# Patient Record
Sex: FEMALE | Race: WHITE | NOT HISPANIC OR LATINO | Employment: OTHER | URBAN - NONMETROPOLITAN AREA
[De-identification: names, ages, dates, MRNs, and addresses within clinical notes are randomized per-mention and may not be internally consistent; named-entity substitution may affect disease eponyms.]

---

## 2017-06-21 ENCOUNTER — OFFICE VISIT (OUTPATIENT)
Dept: GASTROENTEROLOGY | Facility: CLINIC | Age: 72
End: 2017-06-21

## 2017-06-21 VITALS
WEIGHT: 177 LBS | RESPIRATION RATE: 14 BRPM | DIASTOLIC BLOOD PRESSURE: 79 MMHG | HEIGHT: 62 IN | HEART RATE: 82 BPM | SYSTOLIC BLOOD PRESSURE: 128 MMHG | BODY MASS INDEX: 32.57 KG/M2 | TEMPERATURE: 97.5 F

## 2017-06-21 DIAGNOSIS — R13.10 DYSPHAGIA, UNSPECIFIED TYPE: ICD-10-CM

## 2017-06-21 DIAGNOSIS — Z12.11 COLON CANCER SCREENING: ICD-10-CM

## 2017-06-21 DIAGNOSIS — K59.00 CONSTIPATION, UNSPECIFIED CONSTIPATION TYPE: Primary | ICD-10-CM

## 2017-06-21 DIAGNOSIS — R12 HEARTBURN: ICD-10-CM

## 2017-06-21 PROCEDURE — 99203 OFFICE O/P NEW LOW 30 MIN: CPT | Performed by: INTERNAL MEDICINE

## 2017-06-21 RX ORDER — BUSPIRONE HYDROCHLORIDE 10 MG/1
10 TABLET ORAL DAILY
COMMUNITY
Start: 2017-04-26

## 2017-06-21 RX ORDER — ALENDRONATE SODIUM 70 MG/1
70 TABLET ORAL
COMMUNITY
Start: 2017-04-26 | End: 2017-07-12

## 2017-06-21 RX ORDER — PANTOPRAZOLE SODIUM 40 MG/1
40 TABLET, DELAYED RELEASE ORAL DAILY
COMMUNITY
Start: 2017-05-24

## 2017-06-21 RX ORDER — AMLODIPINE BESYLATE 10 MG/1
10 TABLET ORAL DAILY
COMMUNITY
Start: 2017-05-26

## 2017-06-21 RX ORDER — DULOXETIN HYDROCHLORIDE 60 MG/1
60 CAPSULE, DELAYED RELEASE ORAL DAILY
COMMUNITY
Start: 2017-04-26

## 2017-06-21 RX ORDER — NORTRIPTYLINE HYDROCHLORIDE 50 MG/1
50 CAPSULE ORAL NIGHTLY
COMMUNITY
Start: 2017-05-26

## 2017-06-21 RX ORDER — GABAPENTIN 100 MG/1
100 CAPSULE ORAL 3 TIMES DAILY
COMMUNITY
Start: 2017-04-25

## 2017-06-21 RX ORDER — ATORVASTATIN CALCIUM 10 MG/1
10 TABLET, FILM COATED ORAL DAILY
COMMUNITY
Start: 2017-06-16

## 2017-06-21 NOTE — PROGRESS NOTES
"Chief Complaint   Patient presents with   • Heartburn       History of Present Illness      The patient has a history of constipation off and on for the last 1-2 years. Severity is moderate. This is described as hard stools perhaps one bowel movement twice a week. The patient takes occasional stool softeners and laxatives to have a bowel movement. There is no associated rectal pain.    The patient has a history of reflux off and on for the last several years.  The reflux is moderately severe.  Symptoms are described as retrosternal burning sensation, and indigestion.  There is history of occasional regurgitative symptoms.  Frequency being several times per week.  The symptoms are worse at night.  The patient takes acid suppressive therapy with reasonable control of his symptoms.  Although, the patient has some difficulty swallowing home medications and occasionally \"biscuits\" otherwise she denies significant dysphagia with other foods.  There is no recent weight loss.  There is no nausea or vomiting.  There is no history of overt GI bleed (Hematemesis, melena or hematochezia).  There is no recent weight loss.  The patient denies history of anemia.  There is no history of liver or pancreatic disease.    Review of Systems   Constitutional: Positive for fatigue. Negative for appetite change, chills, fever and unexpected weight change.   HENT: Negative for mouth sores, nosebleeds and trouble swallowing.    Eyes: Negative for discharge and redness.   Respiratory: Negative for apnea, cough and shortness of breath.    Cardiovascular: Negative for chest pain, palpitations and leg swelling.   Gastrointestinal: Positive for constipation. Negative for abdominal distention, abdominal pain, anal bleeding, blood in stool, diarrhea, nausea and vomiting.   Endocrine: Negative for cold intolerance, heat intolerance and polydipsia.   Genitourinary: Negative for dysuria, hematuria and urgency.   Musculoskeletal: Positive for " arthralgias. Negative for joint swelling and myalgias.   Skin: Negative for rash.   Allergic/Immunologic: Positive for environmental allergies. Negative for food allergies and immunocompromised state.   Neurological: Negative for dizziness, seizures, syncope and headaches.   Hematological: Negative for adenopathy. Does not bruise/bleed easily.   Psychiatric/Behavioral: Negative for dysphoric mood. The patient is not nervous/anxious and is not hyperactive.      There is no problem list on file for this patient.    Past Medical History:   Diagnosis Date   • Arthritis    • Colon polyp    • Degeneration of lumbar intervertebral disc    • Disorder of vitamin B12    • GERD (gastroesophageal reflux disease)    • Hematuria    • Hyperlipidemia    • Hypertensive disorder    • Mixed anxiety and depressive disorder      Past Surgical History:   Procedure Laterality Date   • APPENDECTOMY  1948   • BACK SURGERY      4 screws in back   • CATARACT EXTRACTION  2015   • TOTAL ABDOMINAL HYSTERECTOMY  1990s     Family History   Problem Relation Age of Onset   • Colon cancer Neg Hx      Social History   Substance Use Topics   • Smoking status: Never Smoker   • Smokeless tobacco: Never Used   • Alcohol use No      Comment: social       Current Outpatient Prescriptions:   •  alendronate (FOSAMAX) 70 MG tablet, , Disp: , Rfl:   •  amLODIPine (NORVASC) 10 MG tablet, , Disp: , Rfl:   •  atorvastatin (LIPITOR) 10 MG tablet, , Disp: , Rfl:   •  Bisacodyl (DULCOLAX PO), Take  by mouth As Needed., Disp: , Rfl:   •  busPIRone (BUSPAR) 10 MG tablet, , Disp: , Rfl:   •  Cholecalciferol (VITAMIN D PO), Take  by mouth., Disp: , Rfl:   •  Cyanocobalamin (VITAMIN B-12 PO), Take  by mouth., Disp: , Rfl:   •  DULoxetine (CYMBALTA) 60 MG capsule, , Disp: , Rfl:   •  gabapentin (NEURONTIN) 100 MG capsule, , Disp: , Rfl:   •  nortriptyline (PAMELOR) 50 MG capsule, , Disp: , Rfl:   •  pantoprazole (PROTONIX) 40 MG EC tablet, , Disp: , Rfl:     Allergies  "  Allergen Reactions   • Codeine Hives       Blood pressure 128/79, pulse 82, temperature 97.5 °F (36.4 °C), resp. rate 14, height 62\" (157.5 cm), weight 177 lb (80.3 kg).    Physical Exam   Constitutional: She is oriented to person, place, and time. She appears well-developed and well-nourished. No distress.   HENT:   Head: Normocephalic and atraumatic.   Right Ear: Hearing and external ear normal.   Left Ear: Hearing and external ear normal.   Nose: Nose normal.   Mouth/Throat: Oropharynx is clear and moist and mucous membranes are normal. Mucous membranes are not pale, not dry and not cyanotic. No oral lesions. No oropharyngeal exudate.   Eyes: Conjunctivae and EOM are normal. Right eye exhibits no discharge. Left eye exhibits no discharge. No scleral icterus.   Neck: Trachea normal. Neck supple. No JVD present. No edema present. No thyroid mass and no thyromegaly present.   Cardiovascular: Normal rate, regular rhythm, S2 normal and normal heart sounds.  Exam reveals no gallop, no S3 and no friction rub.    No murmur heard.  Pulmonary/Chest: Effort normal and breath sounds normal. No respiratory distress. She has no wheezes. She has no rales. She exhibits no tenderness.   Abdominal: Soft. Normal appearance and bowel sounds are normal. She exhibits no distension, no ascites and no mass. There is no splenomegaly or hepatomegaly. There is no tenderness. There is no rigidity, no rebound and no guarding. No hernia.   Musculoskeletal: She exhibits no tenderness or deformity.       Vascular Status -  Her exam exhibits no right foot edema. Her exam exhibits no left foot edema.  Lymphadenopathy:     She has no cervical adenopathy.        Left: No supraclavicular adenopathy present.   Neurological: She is alert and oriented to person, place, and time. She has normal strength. No cranial nerve deficit or sensory deficit. She exhibits normal muscle tone. Coordination normal.   Skin: No rash noted. She is not diaphoretic. No " cyanosis. No pallor. Nails show no clubbing.   Psychiatric: She has a normal mood and affect. Her behavior is normal. Judgment and thought content normal.   Nursing note and vitals reviewed.    Stigmata of chronic liver disease:  None.  Asterixis:  None.    Procedure:  Upon review of medical records:    Dated January 31, 2014 the patient underwent an upper endoscopy which revealed:  Schtazki’s type ring, post dilation to 18 mm.  Sliding hiatal hernia of 3 - 5 cm.  Erythematous/erosive gastritis. Polypoid area at cardia, biopsied.  Subtle concentric rings within the mid esophagus raising the possibility of eosinophilic esophagitis.  Some free gastroesophageal reflux was seen.  No Stovall mucosa was noted.  No scalloping was seen within the second portion of the duodenum.  Second portion of duodenum, biopsy revealed no significant pathologic alterations.  Negative for celiac disease.  Gastric antrum, biopsy revealed reactive gastropathy (chemical gastropathy).  No h. pylori identified (negative CFV stain, adequate control).  Negative for metaplasia, dysplasia, or malignancy.  Mid Esophagus, biopsy revealed reflux esophagitis.  No metaplasia identified (negative AB/PAS stain, adequate control).  Negative for dysplasia, and malignancy.  Cardia biopsy revealed chronic follicular gastritis.  No H. pylori identified (negative CFV stain, adequate control).  Negative for metaplasia, dysplasia, or malignancy.      Dated February 26, 2014 the patient underwent a colonoscopy to the cecum which revealed:  Pandiverticulosis.  Scant vascular ectasia.  Colon polyps.  Internal/small external hemorrhoids.  Rectal polyps, biopsy did not reveal adenomatous change.  The patient's colon was noted to be significantly tortuous and redundant.    Assessment and Plan:      Paulette was seen today for heartburn.    Diagnoses and all orders for this visit:    Constipation, unspecified constipation type  Comments:  Likely  multifactorial.    Colon cancer screening  Comments:  For colon cancer screening.  The patient has history of rather tortuous and redundant colon.    Heartburn  Comments:  Stable.    Dysphagia, unspecified type  Comments:  Some difficulty swallowing the pills and biscuits. Likely secondary to esophageal dysmotility. Previously she had Schatzki's ring which was dilated to 18 mm              Plan  and Patient Instructions:    Patient Instructions     1. Anti-reflex measures.  2. Low-fat high-fiber diet with liberal water intake.  3. Dietary instructions.  The patient should eat relatively soft diet.  She should eat in upright position and chew well.  The patient should drink water after to 3 bites and take medications with liberal amounts of water.  Generally medications that have a potential to cause pill esophagitis may be avoided or used in an alternative form (for example if the patient needs potassium it may be used in a liquid rather than pill form).  Furthermore after eating, and taking medications the patient should remain in upright position for 5-10 minutes.  4. Colonoscopy: Description of the procedure, risks benefits alternatives and options including non-operative options were discussed with the patient in detail.  The patient understands and wishes to proceed.          Enohc Quevedo MD

## 2017-06-22 NOTE — PATIENT INSTRUCTIONS
1. Anti-reflex measures.  2. Low-fat high-fiber diet with liberal water intake.  3. Dietary instructions.  The patient should eat relatively soft diet.  She should eat in upright position and chew well.  The patient should drink water after to 3 bites and take medications with liberal amounts of water.  Generally medications that have a potential to cause pill esophagitis may be avoided or used in an alternative form (for example if the patient needs potassium it may be used in a liquid rather than pill form).  Furthermore after eating, and taking medications the patient should remain in upright position for 5-10 minutes.  4. Colonoscopy: Description of the procedure, risks benefits alternatives and options including non-operative options were discussed with the patient in detail.  The patient understands and wishes to proceed.

## 2017-07-05 ENCOUNTER — PREP FOR SURGERY (OUTPATIENT)
Dept: OTHER | Facility: HOSPITAL | Age: 72
End: 2017-07-05

## 2017-07-05 DIAGNOSIS — Z12.11 COLON CANCER SCREENING: ICD-10-CM

## 2017-07-05 DIAGNOSIS — K59.00 CONSTIPATION, UNSPECIFIED CONSTIPATION TYPE: Primary | ICD-10-CM

## 2017-07-05 RX ORDER — SODIUM CHLORIDE 9 MG/ML
70 INJECTION, SOLUTION INTRAVENOUS CONTINUOUS PRN
Status: CANCELLED | OUTPATIENT
Start: 2017-07-05

## 2017-07-05 RX ORDER — SODIUM CHLORIDE 0.9 % (FLUSH) 0.9 %
1-10 SYRINGE (ML) INJECTION AS NEEDED
Status: CANCELLED | OUTPATIENT
Start: 2017-07-05

## 2017-07-18 ENCOUNTER — ANESTHESIA (OUTPATIENT)
Dept: GASTROENTEROLOGY | Facility: HOSPITAL | Age: 72
End: 2017-07-18

## 2017-07-18 ENCOUNTER — HOSPITAL ENCOUNTER (OUTPATIENT)
Facility: HOSPITAL | Age: 72
Setting detail: HOSPITAL OUTPATIENT SURGERY
Discharge: HOME | End: 2017-07-18
Attending: INTERNAL MEDICINE | Admitting: INTERNAL MEDICINE

## 2017-07-18 ENCOUNTER — ANESTHESIA EVENT (OUTPATIENT)
Dept: GASTROENTEROLOGY | Facility: HOSPITAL | Age: 72
End: 2017-07-18

## 2017-07-18 VITALS
HEIGHT: 62 IN | DIASTOLIC BLOOD PRESSURE: 61 MMHG | OXYGEN SATURATION: 93 % | TEMPERATURE: 96.7 F | SYSTOLIC BLOOD PRESSURE: 116 MMHG | WEIGHT: 175 LBS | RESPIRATION RATE: 18 BRPM | HEART RATE: 80 BPM | BODY MASS INDEX: 32.2 KG/M2

## 2017-07-18 DIAGNOSIS — K59.00 CONSTIPATION, UNSPECIFIED CONSTIPATION TYPE: ICD-10-CM

## 2017-07-18 DIAGNOSIS — Z12.11 COLON CANCER SCREENING: ICD-10-CM

## 2017-07-18 PROCEDURE — 25010000002 PROPOFOL 10 MG/ML EMULSION: Performed by: NURSE ANESTHETIST, CERTIFIED REGISTERED

## 2017-07-18 PROCEDURE — 25010000002 PROPOFOL 1000 MG/ML EMULSION: Performed by: NURSE ANESTHETIST, CERTIFIED REGISTERED

## 2017-07-18 PROCEDURE — 45388 COLONOSCOPY W/ABLATION: CPT | Performed by: INTERNAL MEDICINE

## 2017-07-18 PROCEDURE — 45390 COLONOSCOPY W/RESECTION: CPT | Performed by: INTERNAL MEDICINE

## 2017-07-18 PROCEDURE — 45384 COLONOSCOPY W/LESION REMOVAL: CPT | Performed by: INTERNAL MEDICINE

## 2017-07-18 PROCEDURE — 88305 TISSUE EXAM BY PATHOLOGIST: CPT | Performed by: INTERNAL MEDICINE

## 2017-07-18 DEVICE — DEV CLIP ENDO RESOLUTION360 CONTRL ROT 235CM: Type: IMPLANTABLE DEVICE | Status: FUNCTIONAL

## 2017-07-18 DEVICE — DEV CLIP GI QUICKCLIPPRO FIX ROT 2300MM: Type: IMPLANTABLE DEVICE | Status: FUNCTIONAL

## 2017-07-18 RX ORDER — PROPOFOL 10 MG/ML
VIAL (ML) INTRAVENOUS AS NEEDED
Status: DISCONTINUED | OUTPATIENT
Start: 2017-07-18 | End: 2017-07-18 | Stop reason: SURG

## 2017-07-18 RX ORDER — SODIUM CHLORIDE 0.9 % (FLUSH) 0.9 %
1-10 SYRINGE (ML) INJECTION AS NEEDED
Status: DISCONTINUED | OUTPATIENT
Start: 2017-07-18 | End: 2017-07-18 | Stop reason: HOSPADM

## 2017-07-18 RX ORDER — SODIUM CHLORIDE 9 MG/ML
70 INJECTION, SOLUTION INTRAVENOUS CONTINUOUS PRN
Status: DISCONTINUED | OUTPATIENT
Start: 2017-07-18 | End: 2017-07-18 | Stop reason: HOSPADM

## 2017-07-18 RX ADMIN — PROPOFOL 30 MG: 10 INJECTION, EMULSION INTRAVENOUS at 08:42

## 2017-07-18 RX ADMIN — PROPOFOL 100 MCG/KG/MIN: 10 INJECTION, EMULSION INTRAVENOUS at 08:43

## 2017-07-18 RX ADMIN — SODIUM CHLORIDE 70 ML/HR: 9 INJECTION, SOLUTION INTRAVENOUS at 08:30

## 2017-07-18 RX ADMIN — PROPOFOL 50 MG: 10 INJECTION, EMULSION INTRAVENOUS at 08:49

## 2017-07-18 NOTE — ANESTHESIA PREPROCEDURE EVALUATION
Anesthesia Evaluation     Patient summary reviewed and Nursing notes reviewed   no history of anesthetic complications:  NPO Solid Status: > 8 hours  NPO Liquid Status: > 8 hours     Airway   Mallampati: I  TM distance: >3 FB  Neck ROM: full  no difficulty expected  Dental    (+) poor dentition    Pulmonary - negative pulmonary ROS and normal exam   Cardiovascular - normal exam    (+) hypertension well controlled, hyperlipidemia      Neuro/Psych  (+) psychiatric history,    GI/Hepatic/Renal/Endo    (+) obesity,  GERD well controlled,     Musculoskeletal     Abdominal    Substance History - negative use     OB/GYN negative ob/gyn ROS         Other   (+) arthritis                                     Anesthesia Plan    ASA 2     MAC   (Risks and benefits discussed including risk of aspiration, recall and dental damage. All patient questions answered. Will continue with POC.)  intravenous induction   Anesthetic plan and risks discussed with patient.

## 2017-07-18 NOTE — PLAN OF CARE
Problem: GI Endoscopy (Adult)  Goal: Signs and Symptoms of Listed Potential Problems Will be Absent or Manageable (GI Endoscopy)  Outcome: Ongoing (interventions implemented as appropriate)    07/18/17 0820   GI Endoscopy   Problems Assessed (GI Endoscopy) all   Problems Present (GI Endoscopy) none

## 2017-07-18 NOTE — OP NOTE
PROCEDURE:  Colonoscopy to the terminal ileum with one endoscopic mucosal resection, 11 hot biopsy polypectomies, submucosal injection of normal saline to raise the lesion, thermal ablation therapy using argon plasma coagulator and placement of 2 quick clip pros and one resolution clip to coapt the margins.      DATE OF PROCEDURE:  July 18, 2017    REFERRING PROVIDER:  Mesfin Amin MD     INSTRUMENT USED:   Olympus PCF H 190 videocolonoscope.      INDICATIONS OF THE PROCEDURE: This is a 71-year-old white female for colon cancer screening.  There is history of constipation.       BIOPSIES:  Cecum: 1 endoscopic mucosal resection.  Rectum and sigmoid colon: 11 hot biopsy polypectomies.       PHOTOGRAPHS:  Photographs were included in the medical records.     MEDICATIONS:  MAC.       CONSENT/PREPROCEDURE EVALUATION:  Risks, benefits, alternatives and options of the procedure including risks of sedation/anesthesia were discussed with the patient and informed consent was obtained prior to the procedure.  History and physical examination were performed and nothing precluded the test.      REPORT:  The patient was placed in left lateral decubitus position and a digital examination was performed.  Once under the influence of IV sedation, the instrument was inserted into the rectum and advanced under direct vision to cecum which was identified by the ileocecal valve, triradiate folds and appendiceal orifice. The scope was then maneuvered into the terminal ileum.        FINDINGS:      Digital rectal examination:  Good anal tone.  No perianal pathology.  No mass.        Terminal ileum:  7-8 cm.  Normal.     Cecum and ascending colon: .  A 1.25 cm flat polypoid lesion was noted in the cecum.  Submucosal injection of normal saline 1 ml was undertaken to raise the area . To raise the lesion.  Endoscopic mucosal resection was undertaken using hot snare and cold snare techniques.  This area was further treated with ablation  using argon plasma coagulator (Colon APC mode/ERBE/Side firing probe).  2 quick clip pros and one resolution clip were applied to coapt the margins.  Diverticulosis was noted.      Hepatic flexure, transverse colon, splenic flexure:Diverticulosis.         Descending colon, sigmoid colon and rectum: Diverticulosis.  11, 3-4 mm sessile polyps in the sigmoid colon and rectum were removed with hot biopsy forceps.  Scant vascular ectasia was noted in the descending colon.  A retroflex examination within the rectum revealed internal hemorrhoids.        The scope was then straightened, the lower GI tract was decompressed, and the scope was pulled out of the patient.  The patient tolerated the procedure well.  There were no immediate complications and the patient was transferred in stable condition for post procedure observation.      TECHNICAL DATA:   Polson prep score: 8 (3+2+3).     Difficulty of examination:  Somewhat difficult.  The colon was noted to be tortuous redundant and adherent.   This required the patient to be in left lateral decubitus, supine, and right lateral decubitus position.  Advance the scope into the cecum and then into the terminal ileum.    Withdrawal time: 15 min.    Anus to cecal time: 14 minutes.    Procedure Time: 53 minutes.    Retroflex examination in right colon: Yes.    Second look Rectum to cecum with decompression: Yes.    DIAGNOSES:    1. Pandiverticulosis.  2. Colon polyps.  3-12.5 mm    3. Scant vascular ectasia.  4. Internal hemorrhoids.    RECOMMENDATIONS:     1. Follow biopsies.    2. Follow-up:      3. Followup colonoscopy: Depending on the biopsy results.       4. Dietary instructions.     Thank you very much for letting me participate in the care of this patient. Please do not hesitate to call me if you have any questions.

## 2017-07-18 NOTE — ANESTHESIA POSTPROCEDURE EVALUATION
Patient: Paulette Oshea    Procedure Summary     Date Anesthesia Start Anesthesia Stop Room / Location    07/18/17 0841  [facility] ENDOSCOPY 2 / [facility] ENDOSCOPY       Procedure Diagnosis Surgeon Provider    COLONOSCOPY with endoscopic mucosal resection, argon thermal ablation,  normal saline injection, resolution and pro clip placement, hot biopsy polypectomies (N/A Anus) Colon cancer screening; Constipation, unspecified constipation type  (Colon cancer screening [Z12.11]; Constipation, unspecified constipation type [K59.00]) MD Shahid Solano CRNA          Anesthesia Type: MAC  Last vitals  BP      Temp      Pulse     Resp      SpO2        Post Anesthesia Care and Evaluation    Patient location during evaluation: PHASE II  Patient participation: complete - patient participated  Level of consciousness: awake and alert  Pain score: 0  Pain management: satisfactory to patient  Airway patency: patent  Anesthetic complications: No anesthetic complications  PONV Status: none  Cardiovascular status: acceptable and stable  Respiratory status: acceptable and nasal cannula  Hydration status: acceptable

## 2017-07-18 NOTE — H&P (VIEW-ONLY)
"Chief Complaint   Patient presents with   • Heartburn       History of Present Illness      The patient has a history of constipation off and on for the last 1-2 years. Severity is moderate. This is described as hard stools perhaps one bowel movement twice a week. The patient takes occasional stool softeners and laxatives to have a bowel movement. There is no associated rectal pain.    The patient has a history of reflux off and on for the last several years.  The reflux is moderately severe.  Symptoms are described as retrosternal burning sensation, and indigestion.  There is history of occasional regurgitative symptoms.  Frequency being several times per week.  The symptoms are worse at night.  The patient takes acid suppressive therapy with reasonable control of his symptoms.  Although, the patient has some difficulty swallowing home medications and occasionally \"biscuits\" otherwise she denies significant dysphagia with other foods.  There is no recent weight loss.  There is no nausea or vomiting.  There is no history of overt GI bleed (Hematemesis, melena or hematochezia).  There is no recent weight loss.  The patient denies history of anemia.  There is no history of liver or pancreatic disease.    Review of Systems   Constitutional: Positive for fatigue. Negative for appetite change, chills, fever and unexpected weight change.   HENT: Negative for mouth sores, nosebleeds and trouble swallowing.    Eyes: Negative for discharge and redness.   Respiratory: Negative for apnea, cough and shortness of breath.    Cardiovascular: Negative for chest pain, palpitations and leg swelling.   Gastrointestinal: Positive for constipation. Negative for abdominal distention, abdominal pain, anal bleeding, blood in stool, diarrhea, nausea and vomiting.   Endocrine: Negative for cold intolerance, heat intolerance and polydipsia.   Genitourinary: Negative for dysuria, hematuria and urgency.   Musculoskeletal: Positive for " arthralgias. Negative for joint swelling and myalgias.   Skin: Negative for rash.   Allergic/Immunologic: Positive for environmental allergies. Negative for food allergies and immunocompromised state.   Neurological: Negative for dizziness, seizures, syncope and headaches.   Hematological: Negative for adenopathy. Does not bruise/bleed easily.   Psychiatric/Behavioral: Negative for dysphoric mood. The patient is not nervous/anxious and is not hyperactive.      There is no problem list on file for this patient.    Past Medical History:   Diagnosis Date   • Arthritis    • Colon polyp    • Degeneration of lumbar intervertebral disc    • Disorder of vitamin B12    • GERD (gastroesophageal reflux disease)    • Hematuria    • Hyperlipidemia    • Hypertensive disorder    • Mixed anxiety and depressive disorder      Past Surgical History:   Procedure Laterality Date   • APPENDECTOMY  1948   • BACK SURGERY      4 screws in back   • CATARACT EXTRACTION  2015   • TOTAL ABDOMINAL HYSTERECTOMY  1990s     Family History   Problem Relation Age of Onset   • Colon cancer Neg Hx      Social History   Substance Use Topics   • Smoking status: Never Smoker   • Smokeless tobacco: Never Used   • Alcohol use No      Comment: social       Current Outpatient Prescriptions:   •  alendronate (FOSAMAX) 70 MG tablet, , Disp: , Rfl:   •  amLODIPine (NORVASC) 10 MG tablet, , Disp: , Rfl:   •  atorvastatin (LIPITOR) 10 MG tablet, , Disp: , Rfl:   •  Bisacodyl (DULCOLAX PO), Take  by mouth As Needed., Disp: , Rfl:   •  busPIRone (BUSPAR) 10 MG tablet, , Disp: , Rfl:   •  Cholecalciferol (VITAMIN D PO), Take  by mouth., Disp: , Rfl:   •  Cyanocobalamin (VITAMIN B-12 PO), Take  by mouth., Disp: , Rfl:   •  DULoxetine (CYMBALTA) 60 MG capsule, , Disp: , Rfl:   •  gabapentin (NEURONTIN) 100 MG capsule, , Disp: , Rfl:   •  nortriptyline (PAMELOR) 50 MG capsule, , Disp: , Rfl:   •  pantoprazole (PROTONIX) 40 MG EC tablet, , Disp: , Rfl:     Allergies  "  Allergen Reactions   • Codeine Hives       Blood pressure 128/79, pulse 82, temperature 97.5 °F (36.4 °C), resp. rate 14, height 62\" (157.5 cm), weight 177 lb (80.3 kg).    Physical Exam   Constitutional: She is oriented to person, place, and time. She appears well-developed and well-nourished. No distress.   HENT:   Head: Normocephalic and atraumatic.   Right Ear: Hearing and external ear normal.   Left Ear: Hearing and external ear normal.   Nose: Nose normal.   Mouth/Throat: Oropharynx is clear and moist and mucous membranes are normal. Mucous membranes are not pale, not dry and not cyanotic. No oral lesions. No oropharyngeal exudate.   Eyes: Conjunctivae and EOM are normal. Right eye exhibits no discharge. Left eye exhibits no discharge. No scleral icterus.   Neck: Trachea normal. Neck supple. No JVD present. No edema present. No thyroid mass and no thyromegaly present.   Cardiovascular: Normal rate, regular rhythm, S2 normal and normal heart sounds.  Exam reveals no gallop, no S3 and no friction rub.    No murmur heard.  Pulmonary/Chest: Effort normal and breath sounds normal. No respiratory distress. She has no wheezes. She has no rales. She exhibits no tenderness.   Abdominal: Soft. Normal appearance and bowel sounds are normal. She exhibits no distension, no ascites and no mass. There is no splenomegaly or hepatomegaly. There is no tenderness. There is no rigidity, no rebound and no guarding. No hernia.   Musculoskeletal: She exhibits no tenderness or deformity.       Vascular Status -  Her exam exhibits no right foot edema. Her exam exhibits no left foot edema.  Lymphadenopathy:     She has no cervical adenopathy.        Left: No supraclavicular adenopathy present.   Neurological: She is alert and oriented to person, place, and time. She has normal strength. No cranial nerve deficit or sensory deficit. She exhibits normal muscle tone. Coordination normal.   Skin: No rash noted. She is not diaphoretic. No " cyanosis. No pallor. Nails show no clubbing.   Psychiatric: She has a normal mood and affect. Her behavior is normal. Judgment and thought content normal.   Nursing note and vitals reviewed.    Stigmata of chronic liver disease:  None.  Asterixis:  None.    Procedure:  Upon review of medical records:    Dated January 31, 2014 the patient underwent an upper endoscopy which revealed:  Schtazki’s type ring, post dilation to 18 mm.  Sliding hiatal hernia of 3 - 5 cm.  Erythematous/erosive gastritis. Polypoid area at cardia, biopsied.  Subtle concentric rings within the mid esophagus raising the possibility of eosinophilic esophagitis.  Some free gastroesophageal reflux was seen.  No Stovall mucosa was noted.  No scalloping was seen within the second portion of the duodenum.  Second portion of duodenum, biopsy revealed no significant pathologic alterations.  Negative for celiac disease.  Gastric antrum, biopsy revealed reactive gastropathy (chemical gastropathy).  No h. pylori identified (negative CFV stain, adequate control).  Negative for metaplasia, dysplasia, or malignancy.  Mid Esophagus, biopsy revealed reflux esophagitis.  No metaplasia identified (negative AB/PAS stain, adequate control).  Negative for dysplasia, and malignancy.  Cardia biopsy revealed chronic follicular gastritis.  No H. pylori identified (negative CFV stain, adequate control).  Negative for metaplasia, dysplasia, or malignancy.      Dated February 26, 2014 the patient underwent a colonoscopy to the cecum which revealed:  Pandiverticulosis.  Scant vascular ectasia.  Colon polyps.  Internal/small external hemorrhoids.  Rectal polyps, biopsy did not reveal adenomatous change.  The patient's colon was noted to be significantly tortuous and redundant.    Assessment and Plan:      Paulette was seen today for heartburn.    Diagnoses and all orders for this visit:    Constipation, unspecified constipation type  Comments:  Likely  multifactorial.    Colon cancer screening  Comments:  For colon cancer screening.  The patient has history of rather tortuous and redundant colon.    Heartburn  Comments:  Stable.    Dysphagia, unspecified type  Comments:  Some difficulty swallowing the pills and biscuits. Likely secondary to esophageal dysmotility. Previously she had Schatzki's ring which was dilated to 18 mm              Plan  and Patient Instructions:    Patient Instructions     1. Anti-reflex measures.  2. Low-fat high-fiber diet with liberal water intake.  3. Dietary instructions.  The patient should eat relatively soft diet.  She should eat in upright position and chew well.  The patient should drink water after to 3 bites and take medications with liberal amounts of water.  Generally medications that have a potential to cause pill esophagitis may be avoided or used in an alternative form (for example if the patient needs potassium it may be used in a liquid rather than pill form).  Furthermore after eating, and taking medications the patient should remain in upright position for 5-10 minutes.  4. Colonoscopy: Description of the procedure, risks benefits alternatives and options including non-operative options were discussed with the patient in detail.  The patient understands and wishes to proceed.          Enoch Quevedo MD

## 2017-07-18 NOTE — DISCHARGE INSTRUCTIONS
Rest today  No pushing,pulling,tugging,heavy lifting, or strenuous activity   No major decision making,driving,or drinking alcoholic beverages for 24 hours due to the sedation you received  Always use good hand hygiene/washing technique  No driving on pain medication.    To assist you in voiding:  Drink plenty of fluids  Listen to running water while attempting to void.    If you are unable to urinate and you have an uncomfortable urge to void or it has been   6 hours since you were discharged, return to the Emergency Room    Follow the instructions below marked X upon discharge.    Diet:     x___  Low Fat Diet.  x___  Low Residue/Low fiber Diet for 5 days.     x___Then take:    x___  Fiber One Cereal-40% Nutty Clusters 1/4 cup daily   x___  Ariel's All Bran-Bran Buds 1/4 cup daily.  x___  Use skim, 1, 2 % or soy milk.    Blood Thinner Directions:    x___  avoid aspirin and NSAIDs for 7 days.  Tylenol is okay.        Treatments:  May take Juan magnesium caplet over-the-counter one by mouth daily at bedtime if constipation persists.    Other Instructions:      Call [facility] at [phone] or come to the Emergency   Department if you experience the following: Chest pain, abdominal pain, bleeding  (vomiting of blood or coffee colored material, black stools or jabier blood in stools),   fever/chills, nausea and vomiting or dizziness.      Follow-up:  DR. JESSE GORDON in 3 weeks.Office phone # [phone].

## 2017-07-26 LAB
LAB AP CASE REPORT: NORMAL
Lab: NORMAL
PATH REPORT.FINAL DX SPEC: NORMAL

## 2017-08-09 ENCOUNTER — OFFICE VISIT (OUTPATIENT)
Dept: GASTROENTEROLOGY | Facility: CLINIC | Age: 72
End: 2017-08-09

## 2017-08-09 VITALS
HEART RATE: 92 BPM | RESPIRATION RATE: 18 BRPM | SYSTOLIC BLOOD PRESSURE: 133 MMHG | TEMPERATURE: 97.3 F | HEIGHT: 62 IN | BODY MASS INDEX: 32.39 KG/M2 | WEIGHT: 176 LBS | DIASTOLIC BLOOD PRESSURE: 90 MMHG

## 2017-08-09 DIAGNOSIS — R12 HEARTBURN: ICD-10-CM

## 2017-08-09 DIAGNOSIS — K63.5 COLON POLYPS: Primary | ICD-10-CM

## 2017-08-09 DIAGNOSIS — K59.00 CONSTIPATION, UNSPECIFIED CONSTIPATION TYPE: ICD-10-CM

## 2017-08-09 DIAGNOSIS — K57.30 DIVERTICULOSIS OF COLON WITHOUT HEMORRHAGE: ICD-10-CM

## 2017-08-09 PROCEDURE — 99214 OFFICE O/P EST MOD 30 MIN: CPT | Performed by: INTERNAL MEDICINE

## 2017-08-09 RX ORDER — TRAMADOL HYDROCHLORIDE 50 MG/1
TABLET ORAL
COMMUNITY
Start: 2017-06-29

## 2017-08-09 NOTE — PATIENT INSTRUCTIONS
1. Anti-reflex measures.  2. The patient should eat and take medications in upright position with liberal amounts of water.  Furthermore, after taking medications the patient may remain in upright position for about 5 minutes.  3. Protonix (Pantoprazole) tablet 40 mg tablet. Take 1 tablet orally in the morning half an hour before eating every day.  4. Low-fat-high-fiber diet with liberal water intake.  5. Juan magnesium caplet OTC.  One by mouth daily at bedtime.  6. A possible follow-up colonoscopy may be considered in the future perhaps in July 2022 if continues to be in good health and desired.  7. Follow-up otherwise as needed.  8. The patient may call back in one to 2 weeks regarding progress.  9. Discussed with the patient in detail.  Opportunity was given to ask questions.

## 2017-08-09 NOTE — PROGRESS NOTES
Chief Complaint   Patient presents with   • Follow-up     History of Present Illness     The patient came back for follow visit today.  The patient feels better.  The patient had a history of constipation off and on for the last. Severity was moderate. This was described as hard stools perhaps one bowel movement twice a week. The patient was taking occasional stool softeners and laxatives to have a bowel movement. There was no associated rectal pain.  Although, constipation has improved to some degree the patient continues to have still some constipation.  Currently the patient has been having one bowel movement every other day.    The patient has a history of reflux off and on for the last several years.  The reflux is moderately severe.  Symptoms are described as retrosternal burning sensation, and indigestion.  There is history of occasional regurgitative symptoms.  Frequency being several times per week.  The symptoms are worse at night.  The patient takes acid suppressive therapy with reasonable control of his symptoms.    There is no history of abdominal pain.  There is no history of overt GI bleed (hematemesis melena or hematochezia).  The patient denies nausea or vomiting.    The patient denies dysphagia or odynophagia.  There is no history of recent significant weight loss.  There is no history of liver or pancreatic disease.    Review of Systems   Constitutional: Negative for appetite change, chills, fatigue, fever and unexpected weight change.   HENT: Negative for mouth sores, nosebleeds and trouble swallowing.    Eyes: Negative for discharge and redness.   Respiratory: Negative for apnea, cough and shortness of breath.    Cardiovascular: Negative for chest pain, palpitations and leg swelling.   Gastrointestinal: Positive for constipation. Negative for abdominal distention, abdominal pain, anal bleeding, blood in stool, diarrhea, nausea and vomiting.   Endocrine: Negative for cold intolerance, heat  "intolerance and polydipsia.   Genitourinary: Negative for dysuria, hematuria and urgency.   Musculoskeletal: Positive for arthralgias. Negative for joint swelling and myalgias.   Skin: Negative for rash.   Allergic/Immunologic: Positive for environmental allergies. Negative for food allergies and immunocompromised state.   Neurological: Negative for dizziness, seizures, syncope and headaches.   Hematological: Negative for adenopathy. Does not bruise/bleed easily.   Psychiatric/Behavioral: Negative for dysphoric mood. The patient is not nervous/anxious and is not hyperactive.      There is no problem list on file for this patient.    Past Medical History:   Diagnosis Date   • Arthritis    • Body piercing     EARS   • Colon polyp    • Degeneration of lumbar intervertebral disc    • Disorder of vitamin B12    • Dysphagia     REPORTS SECONDARY TO MEDS DRYING OUT THROAT AND THAT SHE CHOKES EASILY   • GERD (gastroesophageal reflux disease)    • Hematuria    • Hyperlipidemia    • Hypertensive disorder    • Mixed anxiety and depressive disorder    • Wears glasses    • Wears partial dentures     PARTIAL PLATES IN UPPER AND LOWER MOUTH     Past Surgical History:   Procedure Laterality Date   • APPENDECTOMY  1948   • BACK SURGERY      4 screws in back   • COLONOSCOPY     • COLONOSCOPY N/A 7/18/2017    Procedure: COLONOSCOPY with endoscopic mucosal resection, argon thermal ablation,  normal saline injection, resolution and pro clip placement, hot biopsy polypectomies;  Surgeon: Enoch Quevedo MD;  Location: [facility] ENDOSCOPY;  Service:    • EYE SURGERY  2015    REPORTS SURGERY FOR \"DROOPING EYELIDS\"   • TOTAL ABDOMINAL HYSTERECTOMY  1990s   • WISDOM TOOTH EXTRACTION      REPORTS EXTRACTIONS X2     Family History   Problem Relation Age of Onset   • Colon cancer Neg Hx      Social History   Substance Use Topics   • Smoking status: Never Smoker   • Smokeless tobacco: Never Used   • Alcohol use No      Comment: social       Current " "Outpatient Prescriptions:   •  amLODIPine (NORVASC) 10 MG tablet, Take 10 mg by mouth Daily., Disp: , Rfl:   •  atorvastatin (LIPITOR) 10 MG tablet, Take 10 mg by mouth Daily., Disp: , Rfl:   •  busPIRone (BUSPAR) 10 MG tablet, Take 10 mg by mouth Daily., Disp: , Rfl:   •  Cholecalciferol (VITAMIN D PO), Take 1 tablet by mouth Daily., Disp: , Rfl:   •  Cyanocobalamin (VITAMIN B-12 PO), Take 1 tablet by mouth Daily., Disp: , Rfl:   •  DULoxetine (CYMBALTA) 60 MG capsule, Take 60 mg by mouth Daily., Disp: , Rfl:   •  gabapentin (NEURONTIN) 100 MG capsule, Take 100 mg by mouth 3 (Three) Times a Day., Disp: , Rfl:   •  nortriptyline (PAMELOR) 50 MG capsule, Take 50 mg by mouth Every Night., Disp: , Rfl:   •  pantoprazole (PROTONIX) 40 MG EC tablet, Take 40 mg by mouth Daily., Disp: , Rfl:   •  traMADol (ULTRAM) 50 MG tablet, , Disp: , Rfl:     Allergies   Allergen Reactions   • Codeine Hives       Blood pressure 133/90, pulse 92, temperature 97.3 °F (36.3 °C), resp. rate 18, height 62\" (157.5 cm), weight 176 lb (79.8 kg).    Physical Exam   Constitutional: She is oriented to person, place, and time. She appears well-developed and well-nourished. No distress.   HENT:   Head: Normocephalic and atraumatic.   Right Ear: Hearing and external ear normal.   Left Ear: Hearing and external ear normal.   Nose: Nose normal.   Mouth/Throat: Oropharynx is clear and moist and mucous membranes are normal. Mucous membranes are not pale, not dry and not cyanotic. No oral lesions. No oropharyngeal exudate.   Eyes: Conjunctivae and EOM are normal. Right eye exhibits no discharge. Left eye exhibits no discharge. No scleral icterus.   Neck: Trachea normal. Neck supple. No JVD present. No edema present. No thyroid mass and no thyromegaly present.   Cardiovascular: Normal rate, regular rhythm, S2 normal and normal heart sounds.  Exam reveals no gallop, no S3 and no friction rub.    No murmur heard.  Pulmonary/Chest: Effort normal and breath " sounds normal. No respiratory distress. She has no wheezes. She has no rales. She exhibits no tenderness.   Abdominal: Soft. Normal appearance and bowel sounds are normal. She exhibits no distension, no ascites and no mass. There is no splenomegaly or hepatomegaly. There is no tenderness. There is no rigidity, no rebound and no guarding. No hernia.   Musculoskeletal: She exhibits no tenderness or deformity.       Vascular Status -  Her exam exhibits no right foot edema. Her exam exhibits no left foot edema.  Lymphadenopathy:     She has no cervical adenopathy.        Left: No supraclavicular adenopathy present.   Neurological: She is alert and oriented to person, place, and time. She has normal strength. No cranial nerve deficit or sensory deficit. She exhibits normal muscle tone. Coordination normal.   Skin: No rash noted. She is not diaphoretic. No cyanosis. No pallor. Nails show no clubbing.   Psychiatric: She has a normal mood and affect. Her behavior is normal. Judgment and thought content normal.   Nursing note and vitals reviewed.    Stigmata of chronic liver disease:  None.  Asterixis:  None.    Procedures:   Upon review of medical records:     Dated January 31, 2014 the patient underwent an upper endoscopy which revealed:  Schtazki’s type ring, post dilation to 18 mm.  Sliding hiatal hernia of 3 - 5 cm.  Erythematous/erosive gastritis. Polypoid area at cardia, biopsied.  Subtle concentric rings within the mid esophagus raising the possibility of eosinophilic esophagitis.  Some free gastroesophageal reflux was seen.  No Stovall mucosa was noted.  No scalloping was seen within the second portion of the duodenum.  Second portion of duodenum, biopsy revealed no significant pathologic alterations.  Negative for celiac disease.  Gastric antrum, biopsy revealed reactive gastropathy (chemical gastropathy).  No h. pylori identified (negative CFV stain, adequate control).  Negative for metaplasia, dysplasia, or  malignancy.  Mid Esophagus, biopsy revealed reflux esophagitis.  No metaplasia identified (negative AB/PAS stain, adequate control).  Negative for dysplasia, and malignancy.  Cardia biopsy revealed chronic follicular gastritis.  No H. pylori identified (negative CFV stain, adequate control).  Negative for metaplasia, dysplasia, or malignancy.       Dated July 18, 2017 the patient underwent a colonoscopy to the terminal ileum which revealed: Pandiverticulosis.  Colon polyps.  3-12.5 mm.  Scant vascular ectasia.  Internal hemorrhoids.  Cecum polyp, biopsy revealed hyperplastic polyps (2).  Rectosigmoid polyps, biopsy revealed hyperplastic polyps (2).    Assessment and Plan:      Paulette was seen today for follow-up.    Diagnoses and all orders for this visit:    Colon polyps  Comments:  No adenomatous change.    Diverticulosis of colon without hemorrhage  Comments:  Pandiverticulosis.  Stable.    Heartburn  Comments:  Stable.    Constipation, unspecified constipation type  Comments:  Improved.       Plan  and Patient Instructions:    Patient Instructions     1. Anti-reflex measures.  2. The patient should eat and take medications in upright position with liberal amounts of water.  Furthermore, after taking medications the patient may remain in upright position for about 5 minutes.  3. Protonix (Pantoprazole) tablet 40 mg tablet. Take 1 tablet orally in the morning half an hour before eating every day.  4. Low-fat-high-fiber diet with liberal water intake.  5. Juan magnesium caplet OTC.  One by mouth daily at bedtime.  6. A possible follow-up colonoscopy may be considered in the future perhaps in July 2022 if continues to be in good health and desired.  7. Follow-up otherwise as needed.  8. The patient may call back in one to 2 weeks regarding progress.  9. Discussed with the patient in detail.  Opportunity was given to ask questions.          Enoch Quevedo MD

## 2018-09-04 ENCOUNTER — OFFICE VISIT (OUTPATIENT)
Dept: OBSTETRICS AND GYNECOLOGY | Facility: CLINIC | Age: 73
End: 2018-09-04

## 2018-09-04 VITALS
DIASTOLIC BLOOD PRESSURE: 82 MMHG | HEIGHT: 62 IN | WEIGHT: 174.4 LBS | SYSTOLIC BLOOD PRESSURE: 122 MMHG | BODY MASS INDEX: 32.09 KG/M2

## 2018-09-04 DIAGNOSIS — N95.1 HOT FLASHES DUE TO MENOPAUSE: Primary | ICD-10-CM

## 2018-09-04 PROCEDURE — 99203 OFFICE O/P NEW LOW 30 MIN: CPT | Performed by: PHYSICIAN ASSISTANT

## 2018-09-04 RX ORDER — ESTRADIOL 0.5 MG/1
0.5 TABLET ORAL DAILY
Qty: 30 TABLET | Refills: 6 | Status: SHIPPED | OUTPATIENT
Start: 2018-09-04

## 2018-09-04 NOTE — PATIENT INSTRUCTIONS
Patient counseled regarding options for menopausal symptoms including over the counter options and prescription HRT. Patient strongly desires to go back on HRT for a while as symptoms are so bad  She is advised to schedule her screening mammogram since this is due  Will start estradiol 0.5 mg daily. Follow up 8 weeks to review

## 2018-09-04 NOTE — PROGRESS NOTES
Subjective   Chief Complaint   Patient presents with   • Hot Flashes     complaining of hot flashes and night sweats       Paulette Oshea is a 72 y.o. year old new patient  presenting to be seen for complaint of hot flashes  She reports hot flashes and night sweats started back up 2018  Previous hysterectomy and BSO in the . She took HRT for one year at that time and stopped  The about 5 years ago had hot flashes and took estroven and later black cohosh for a while but discontinued it.  Her mammogram was due 2018 and does mammogram  Aiden ellis   She reports hot flashes and night sweats are so bad she wants to go back on HRT.   Reports had normal thyroid labs within the past year with her pcp     Past Medical History:   Diagnosis Date   • Arthritis    • Body piercing     EARS   • Colon polyp    • Degeneration of lumbar intervertebral disc    • Depression    • Disorder of vitamin B12    • Dysphagia     REPORTS SECONDARY TO MEDS DRYING OUT THROAT AND THAT SHE CHOKES EASILY   • GERD (gastroesophageal reflux disease)    • Hematuria    • Hyperlipidemia    • Hypertensive disorder    • Mixed anxiety and depressive disorder    • Wears glasses    • Wears partial dentures     PARTIAL PLATES IN UPPER AND LOWER MOUTH        Current Outpatient Prescriptions:   •  amLODIPine (NORVASC) 10 MG tablet, Take 10 mg by mouth Daily., Disp: , Rfl:   •  atorvastatin (LIPITOR) 10 MG tablet, Take 10 mg by mouth Daily., Disp: , Rfl:   •  busPIRone (BUSPAR) 10 MG tablet, Take 10 mg by mouth Daily., Disp: , Rfl:   •  Cholecalciferol (VITAMIN D PO), Take 1 tablet by mouth Daily., Disp: , Rfl:   •  Cyanocobalamin (VITAMIN B-12 PO), Take 1 tablet by mouth Daily., Disp: , Rfl:   •  DICLOFENAC PO, Take  by mouth., Disp: , Rfl:   •  gabapentin (NEURONTIN) 100 MG capsule, Take 100 mg by mouth 3 (Three) Times a Day., Disp: , Rfl:   •  nortriptyline (PAMELOR) 50 MG capsule, Take 50 mg by mouth Every Night., Disp: ,  "Rfl:   •  pantoprazole (PROTONIX) 40 MG EC tablet, Take 40 mg by mouth Daily., Disp: , Rfl:   •  traMADol (ULTRAM) 50 MG tablet, , Disp: , Rfl:   •  DULoxetine (CYMBALTA) 60 MG capsule, Take 60 mg by mouth Daily., Disp: , Rfl:   •  estradiol (ESTRACE) 0.5 MG tablet, Take 1 tablet by mouth Daily., Disp: 30 tablet, Rfl: 6   Allergies   Allergen Reactions   • Codeine Hives and GI Intolerance   • Iodine Nausea And Vomiting      Past Surgical History:   Procedure Laterality Date   • APPENDECTOMY  1948   • BACK SURGERY      4 screws in back   • BILATERAL OOPHORECTOMY     • COLONOSCOPY     • COLONOSCOPY N/A 7/18/2017    Procedure: COLONOSCOPY with endoscopic mucosal resection, argon thermal ablation,  normal saline injection, resolution and pro clip placement, hot biopsy polypectomies;  Surgeon: Enoch Quevedo MD;  Location: [facility] ENDOSCOPY;  Service:    • EYE SURGERY  2015    REPORTS SURGERY FOR \"DROOPING EYELIDS\"   • TOTAL ABDOMINAL HYSTERECTOMY  1990s   • WISDOM TOOTH EXTRACTION      REPORTS EXTRACTIONS X2      Social History     Social History   • Marital status:      Spouse name: N/A   • Number of children: N/A   • Years of education: N/A     Occupational History   • Not on file.     Social History Main Topics   • Smoking status: Never Smoker   • Smokeless tobacco: Never Used   • Alcohol use No      Comment: social   • Drug use: No   • Sexual activity: No     Other Topics Concern   • Not on file     Social History Narrative   • No narrative on file      Family History   Problem Relation Age of Onset   • Coronary artery disease Mother    • Hypertension Mother    • Colon cancer Neg Hx        Review of Systems   Constitutional:        Weight gain   Gastrointestinal: Positive for constipation.   Endocrine:        Hot flashes   Genitourinary: Positive for frequency.   Psychiatric/Behavioral: Positive for dysphoric mood and sleep disturbance.   All other systems reviewed and are negative.          Objective   BP " "122/82   Ht 157.5 cm (62\")   Wt 79.1 kg (174 lb 6.4 oz)   Breastfeeding? No   BMI 31.90 kg/m²     Physical Exam   Constitutional: She appears well-developed and well-nourished. She is cooperative.   Eyes: Conjunctivae and lids are normal.   Neck: No thyroid mass and no thyromegaly present.   Cardiovascular: Normal rate, regular rhythm and normal heart sounds.    Pulmonary/Chest: Effort normal and breath sounds normal.   Abdominal: Soft. Normal appearance. There is no tenderness.   Neurological: She is alert.   Skin: Skin is warm and dry.   Psychiatric: She has a normal mood and affect. Her behavior is normal.            Assessment and Plan  Paulette was seen today for hot flashes.    Diagnoses and all orders for this visit:    Hot flashes due to menopause    Other orders  -     estradiol (ESTRACE) 0.5 MG tablet; Take 1 tablet by mouth Daily.      Patient Instructions   Patient counseled regarding options for menopausal symptoms including over the counter options and prescription HRT. Patient strongly desires to go back on HRT for a while as symptoms are so bad  She is advised to schedule her screening mammogram since this is due  Will start estradiol 0.5 mg daily. Follow up 8 weeks to review             This note was electronically signed.    Gabriela Cleary PA-C   September 4, 2018  "

## 2018-10-31 ENCOUNTER — OFFICE VISIT (OUTPATIENT)
Dept: OBSTETRICS AND GYNECOLOGY | Facility: CLINIC | Age: 73
End: 2018-10-31

## 2018-10-31 VITALS
HEIGHT: 62 IN | SYSTOLIC BLOOD PRESSURE: 128 MMHG | BODY MASS INDEX: 31.1 KG/M2 | DIASTOLIC BLOOD PRESSURE: 80 MMHG | WEIGHT: 169 LBS

## 2018-10-31 DIAGNOSIS — Z79.890 MENOPAUSAL SYNDROME ON HORMONE REPLACEMENT THERAPY: Primary | ICD-10-CM

## 2018-10-31 DIAGNOSIS — N95.1 MENOPAUSAL SYNDROME ON HORMONE REPLACEMENT THERAPY: Primary | ICD-10-CM

## 2018-10-31 PROCEDURE — 99212 OFFICE O/P EST SF 10 MIN: CPT | Performed by: PHYSICIAN ASSISTANT

## 2018-10-31 RX ORDER — INFLUENZA A VIRUS A/MICHIGAN/45/2015 X-275 (H1N1) ANTIGEN (FORMALDEHYDE INACTIVATED), INFLUENZA A VIRUS A/SINGAPORE/INFIMH-16-0019/2016 IVR-186 (H3N2) ANTIGEN (FORMALDEHYDE INACTIVATED), AND INFLUENZA B VIRUS B/MARYLAND/15/2016 BX-69A (A B/COLORADO/6/2017-LIKE VIRUS) ANTIGEN (FORMALDEHYDE INACTIVATED) 60; 60; 60 UG/.5ML; UG/.5ML; UG/.5ML
INJECTION, SUSPENSION INTRAMUSCULAR
Refills: 0 | COMMUNITY
Start: 2018-10-13

## 2018-10-31 RX ORDER — LOSARTAN POTASSIUM 25 MG/1
TABLET ORAL
COMMUNITY

## 2018-10-31 NOTE — PROGRESS NOTES
Subjective   Chief Complaint   Patient presents with   • Follow-up     Following up on Estradiol; doing well       Paulette Oshea is a 73 y.o. year old  presenting to be seen for follow up estradiol  Patient requested to resume estradiol 2 months ago due to extreme hot flashes and night sweats  She reports she is doing very well now. Feels hot flashes have resolved 100%. She is sleeping well   She desires to continue estradiol for now  Has no new complaints      Past Medical History:   Diagnosis Date   • Arthritis    • Body piercing     EARS   • Colon polyp    • Degeneration of lumbar intervertebral disc    • Depression    • Disorder of vitamin B12    • Dysphagia     REPORTS SECONDARY TO MEDS DRYING OUT THROAT AND THAT SHE CHOKES EASILY   • GERD (gastroesophageal reflux disease)    • Hematuria    • Hyperlipidemia    • Hypertensive disorder    • Mixed anxiety and depressive disorder    • Wears glasses    • Wears partial dentures     PARTIAL PLATES IN UPPER AND LOWER MOUTH        Current Outpatient Prescriptions:   •  amLODIPine (NORVASC) 10 MG tablet, Take 10 mg by mouth Daily., Disp: , Rfl:   •  atorvastatin (LIPITOR) 10 MG tablet, Take 10 mg by mouth Daily., Disp: , Rfl:   •  busPIRone (BUSPAR) 10 MG tablet, Take 10 mg by mouth Daily., Disp: , Rfl:   •  Cholecalciferol (VITAMIN D PO), Take 1 tablet by mouth Daily., Disp: , Rfl:   •  Cyanocobalamin (VITAMIN B-12 PO), Take 1 tablet by mouth Daily., Disp: , Rfl:   •  DICLOFENAC PO, Take  by mouth., Disp: , Rfl:   •  estradiol (ESTRACE) 0.5 MG tablet, Take 1 tablet by mouth Daily., Disp: 30 tablet, Rfl: 6  •  FLUZONE HIGH-DOSE 0.5 ML suspension prefilled syringe injection, ADM 0.5ML IM UTD, Disp: , Rfl: 0  •  gabapentin (NEURONTIN) 100 MG capsule, Take 100 mg by mouth 3 (Three) Times a Day., Disp: , Rfl:   •  losartan (COZAAR) 25 MG tablet, losartan 25 mg tablet  Take 1 tablet every day by oral route for 30 days., Disp: , Rfl:   •  nortriptyline  "(PAMELOR) 50 MG capsule, Take 50 mg by mouth Every Night., Disp: , Rfl:   •  pantoprazole (PROTONIX) 40 MG EC tablet, Take 40 mg by mouth Daily., Disp: , Rfl:   •  traMADol (ULTRAM) 50 MG tablet, , Disp: , Rfl:   •  DULoxetine (CYMBALTA) 60 MG capsule, Take 60 mg by mouth Daily., Disp: , Rfl:    Allergies   Allergen Reactions   • Codeine Hives and GI Intolerance   • Iodine Nausea And Vomiting      Past Surgical History:   Procedure Laterality Date   • APPENDECTOMY  1948   • BACK SURGERY      4 screws in back   • BILATERAL OOPHORECTOMY     • COLONOSCOPY     • COLONOSCOPY N/A 7/18/2017    Procedure: COLONOSCOPY with endoscopic mucosal resection, argon thermal ablation,  normal saline injection, resolution and pro clip placement, hot biopsy polypectomies;  Surgeon: Enoch Quevedo MD;  Location: [facility] ENDOSCOPY;  Service:    • EYE SURGERY  2015    REPORTS SURGERY FOR \"DROOPING EYELIDS\"   • TOTAL ABDOMINAL HYSTERECTOMY  1990s   • WISDOM TOOTH EXTRACTION      REPORTS EXTRACTIONS X2      Social History     Social History   • Marital status:      Spouse name: N/A   • Number of children: N/A   • Years of education: N/A     Occupational History   • Not on file.     Social History Main Topics   • Smoking status: Never Smoker   • Smokeless tobacco: Never Used   • Alcohol use No      Comment: social   • Drug use: No   • Sexual activity: No     Other Topics Concern   • Not on file     Social History Narrative   • No narrative on file      Family History   Problem Relation Age of Onset   • Coronary artery disease Mother    • Hypertension Mother    • Colon cancer Neg Hx        Review of Systems   Constitutional: Negative.    Gastrointestinal: Negative.    Genitourinary: Negative.            Objective   /80   Ht 157.5 cm (62\")   Wt 76.7 kg (169 lb)   Breastfeeding? No   BMI 30.91 kg/m²     Physical Exam         Assessment and Plan  Paulette was seen today for follow-up.    Diagnoses and all orders for this " visit:    Menopausal syndrome on hormone replacement therapy      Patient Instructions   Will continue estradiol 0.5 mg   Follow up 6 months or prn             This note was electronically signed.    Gabriela Cleary PA-C   October 31, 2018

## 2019-05-17 ENCOUNTER — TRANSCRIBE ORDERS (OUTPATIENT)
Dept: ADMINISTRATIVE | Facility: HOSPITAL | Age: 74
End: 2019-05-17

## 2019-05-17 DIAGNOSIS — Z78.0 MENOPAUSE: Primary | ICD-10-CM

## 2019-05-23 ENCOUNTER — APPOINTMENT (OUTPATIENT)
Dept: BONE DENSITY | Facility: HOSPITAL | Age: 74
End: 2019-05-23

## 2019-05-23 DIAGNOSIS — Z78.0 MENOPAUSE: ICD-10-CM

## 2019-05-23 PROCEDURE — 77080 DXA BONE DENSITY AXIAL: CPT
